# Patient Record
Sex: FEMALE | Race: WHITE | NOT HISPANIC OR LATINO | Employment: OTHER | URBAN - METROPOLITAN AREA
[De-identification: names, ages, dates, MRNs, and addresses within clinical notes are randomized per-mention and may not be internally consistent; named-entity substitution may affect disease eponyms.]

---

## 2023-03-20 DIAGNOSIS — D89.813 GRAFT VS HOST DISEASE (MULTI): ICD-10-CM

## 2023-03-20 DIAGNOSIS — N39.0 RECURRENT UTI: Primary | ICD-10-CM

## 2023-03-20 DIAGNOSIS — E11.69 TYPE 2 DIABETES MELLITUS WITH OTHER SPECIFIED COMPLICATION, WITHOUT LONG-TERM CURRENT USE OF INSULIN (MULTI): Primary | ICD-10-CM

## 2023-03-20 RX ORDER — PREDNISONE 1 MG/1
1 TABLET ORAL DAILY
COMMUNITY
Start: 2022-04-11 | End: 2023-03-20 | Stop reason: SDUPTHER

## 2023-03-20 RX ORDER — ASPIRIN 81 MG/1
81 TABLET ORAL DAILY
COMMUNITY

## 2023-03-20 RX ORDER — ROSUVASTATIN CALCIUM 10 MG/1
10 TABLET, COATED ORAL NIGHTLY
COMMUNITY

## 2023-03-20 RX ORDER — PREDNISONE 1 MG/1
1 TABLET ORAL DAILY
Qty: 90 TABLET | Refills: 3 | Status: SHIPPED | OUTPATIENT
Start: 2023-03-20 | End: 2023-07-23

## 2023-03-20 RX ORDER — ESTRADIOL 0.1 MG/G
1 CREAM VAGINAL 3 TIMES WEEKLY
COMMUNITY
End: 2023-03-20 | Stop reason: SDUPTHER

## 2023-03-20 RX ORDER — AMLODIPINE BESYLATE 10 MG/1
1 TABLET ORAL DAILY
COMMUNITY
Start: 2022-01-01 | End: 2023-07-10 | Stop reason: SDUPTHER

## 2023-03-20 RX ORDER — METFORMIN HYDROCHLORIDE 500 MG/1
1000 TABLET, EXTENDED RELEASE ORAL
COMMUNITY
Start: 2021-02-07 | End: 2023-03-20 | Stop reason: SDUPTHER

## 2023-03-20 RX ORDER — POTASSIUM CHLORIDE 750 MG/1
10 TABLET, FILM COATED, EXTENDED RELEASE ORAL 3 TIMES WEEKLY
COMMUNITY
Start: 2022-04-08 | End: 2023-04-03 | Stop reason: SDUPTHER

## 2023-03-20 RX ORDER — METFORMIN HYDROCHLORIDE 500 MG/1
1000 TABLET, EXTENDED RELEASE ORAL
Qty: 180 TABLET | Refills: 3 | Status: SHIPPED | OUTPATIENT
Start: 2023-03-20 | End: 2024-03-19

## 2023-03-20 RX ORDER — ONDANSETRON 4 MG/1
4 TABLET, ORALLY DISINTEGRATING ORAL EVERY 8 HOURS PRN
COMMUNITY
Start: 2021-01-28 | End: 2023-05-10 | Stop reason: SDUPTHER

## 2023-03-20 RX ORDER — CHOLECALCIFEROL (VITAMIN D3) 125 MCG
125 CAPSULE ORAL DAILY
COMMUNITY

## 2023-03-20 RX ORDER — ESTRADIOL 0.1 MG/G
1 CREAM VAGINAL 3 TIMES WEEKLY
Qty: 42.5 G | Refills: 3 | Status: SHIPPED | OUTPATIENT
Start: 2023-03-20 | End: 2023-10-16 | Stop reason: SDUPTHER

## 2023-03-24 DIAGNOSIS — Z94.81 BONE MARROW TRANSPLANT STATUS (MULTI): ICD-10-CM

## 2023-03-24 DIAGNOSIS — D89.813 GRAFT VS HOST DISEASE (MULTI): ICD-10-CM

## 2023-03-24 RX ORDER — PREDNISONE 5 MG/1
TABLET ORAL
Qty: 90 TABLET | OUTPATIENT
Start: 2023-03-24

## 2023-03-24 RX ORDER — PREDNISONE 1 MG/1
TABLET ORAL
Qty: 270 TABLET | OUTPATIENT
Start: 2023-03-24

## 2023-04-03 DIAGNOSIS — Z94.81 BONE MARROW TRANSPLANT STATUS (MULTI): ICD-10-CM

## 2023-04-03 DIAGNOSIS — D89.813 GRAFT VS HOST DISEASE (MULTI): ICD-10-CM

## 2023-04-03 RX ORDER — PREDNISONE 5 MG/1
5 TABLET ORAL DAILY
Qty: 30 TABLET | Refills: 3 | Status: SHIPPED | OUTPATIENT
Start: 2023-04-03 | End: 2023-04-03 | Stop reason: SDUPTHER

## 2023-04-03 RX ORDER — PREDNISONE 5 MG/1
5 TABLET ORAL DAILY
Qty: 30 TABLET | Refills: 3 | Status: SHIPPED | OUTPATIENT
Start: 2023-04-03 | End: 2023-06-25 | Stop reason: SDUPTHER

## 2023-04-03 RX ORDER — PREDNISONE 5 MG/1
5 TABLET ORAL DAILY
COMMUNITY
End: 2023-04-03 | Stop reason: SDUPTHER

## 2023-04-03 RX ORDER — POTASSIUM CHLORIDE 750 MG/1
10 TABLET, FILM COATED, EXTENDED RELEASE ORAL 3 TIMES WEEKLY
Qty: 30 TABLET | Refills: 3 | Status: SHIPPED | OUTPATIENT
Start: 2023-04-03 | End: 2023-04-03 | Stop reason: SDUPTHER

## 2023-04-03 RX ORDER — POTASSIUM CHLORIDE 750 MG/1
10 TABLET, FILM COATED, EXTENDED RELEASE ORAL DAILY
Qty: 90 TABLET | Refills: 3 | Status: SHIPPED | OUTPATIENT
Start: 2023-04-03 | End: 2024-04-02

## 2023-04-25 ENCOUNTER — DOCUMENTATION (OUTPATIENT)
Dept: PRIMARY CARE | Facility: CLINIC | Age: 68
End: 2023-04-25
Payer: MEDICARE

## 2023-04-25 NOTE — PROGRESS NOTES
Called this morning with severe pain in right upper quadrant wrapping around to her back.  Associated with nausea an vomiting .  Took tylenol and zofran without relief.  Advised her to go to ER. She is in [place].  Her  Dr. Zamorano called a few hours later with an update. 6.8 cm x 5 cm mass in area of tail of pancreas ( which has previously been resected).  Is present.  Dr. Zamorano planning on flying down to [place] tomorrow.  She is being admitted for pain control.  She has follow up appointment in [place] on Wednesday with Dr. Trevor Nguyen in surgery at [facility].

## 2023-04-26 DIAGNOSIS — D89.813 GRAFT VS HOST DISEASE (MULTI): ICD-10-CM

## 2023-04-26 DIAGNOSIS — Z94.81 BONE MARROW TRANSPLANT STATUS (MULTI): ICD-10-CM

## 2023-04-26 RX ORDER — RUXOLITINIB 5 MG/1
TABLET ORAL
Qty: 30 TABLET | Refills: 0 | Status: SHIPPED | OUTPATIENT
Start: 2023-04-26 | End: 2023-05-30 | Stop reason: SDUPTHER

## 2023-05-10 ENCOUNTER — TELEPHONE (OUTPATIENT)
Dept: PRIMARY CARE | Facility: CLINIC | Age: 68
End: 2023-05-10
Payer: MEDICARE

## 2023-05-10 DIAGNOSIS — R11.0 NAUSEA: Primary | ICD-10-CM

## 2023-05-10 RX ORDER — ONDANSETRON 4 MG/1
4 TABLET, ORALLY DISINTEGRATING ORAL EVERY 8 HOURS PRN
Qty: 30 TABLET | Refills: 1 | Status: SHIPPED | OUTPATIENT
Start: 2023-05-10 | End: 2023-05-23 | Stop reason: SDUPTHER

## 2023-05-10 NOTE — TELEPHONE ENCOUNTER
Update-  They are back in [place] until May 17.  She is doing well, pain is being controlled only with Tylenol. She is  having some nausea and would like a Rx for Zofran sent to [facility] in [place]      I can call her when Rx sent.

## 2023-05-17 PROBLEM — K86.2 PANCREATIC CYST (HHS-HCC): Status: RESOLVED | Noted: 2023-05-17 | Resolved: 2023-05-17

## 2023-05-17 PROBLEM — M43.10 ACQUIRED SPONDYLOLISTHESIS: Status: RESOLVED | Noted: 2023-05-17 | Resolved: 2023-05-17

## 2023-05-17 PROBLEM — K21.9 GERD (GASTROESOPHAGEAL REFLUX DISEASE): Status: ACTIVE | Noted: 2023-05-17

## 2023-05-17 PROBLEM — G47.33 OBSTRUCTIVE SLEEP APNEA OF ADULT: Status: ACTIVE | Noted: 2023-05-17

## 2023-05-17 PROBLEM — M48.061 LUMBAR STENOSIS: Status: ACTIVE | Noted: 2023-05-17

## 2023-05-17 PROBLEM — R31.21 ASYMPTOMATIC MICROSCOPIC HEMATURIA: Status: RESOLVED | Noted: 2023-05-17 | Resolved: 2023-05-17

## 2023-05-17 PROBLEM — E27.49 SECONDARY ADRENAL INSUFFICIENCY (MULTI): Status: ACTIVE | Noted: 2023-05-17

## 2023-05-17 PROBLEM — M71.38 SYNOVIAL CYST OF LUMBAR SPINE: Status: RESOLVED | Noted: 2023-05-17 | Resolved: 2023-05-17

## 2023-05-17 PROBLEM — D84.9 IMMUNODEFICIENCY (MULTI): Status: ACTIVE | Noted: 2023-05-17

## 2023-05-17 PROBLEM — D89.813 GRAFT VS HOST DISEASE (MULTI): Status: ACTIVE | Noted: 2023-05-17

## 2023-05-17 PROBLEM — F41.9 ANXIETY DISORDER: Status: RESOLVED | Noted: 2023-05-17 | Resolved: 2023-05-17

## 2023-05-17 PROBLEM — E87.6 HYPOKALEMIA: Status: RESOLVED | Noted: 2023-05-17 | Resolved: 2023-05-17

## 2023-05-17 PROBLEM — E27.40 ADRENAL INSUFFICIENCY (MULTI): Status: ACTIVE | Noted: 2023-05-17

## 2023-05-17 PROBLEM — H04.123 BILATERAL DRY EYES: Status: RESOLVED | Noted: 2023-05-17 | Resolved: 2023-05-17

## 2023-05-17 PROBLEM — E78.5 HYPERLIPIDEMIA: Status: ACTIVE | Noted: 2023-05-17

## 2023-05-17 PROBLEM — M81.0 OSTEOPOROSIS: Status: ACTIVE | Noted: 2023-05-17

## 2023-05-17 PROBLEM — D17.71 ANGIOLIPOMA OF KIDNEY: Status: ACTIVE | Noted: 2023-05-17

## 2023-05-17 PROBLEM — H90.3 SENSORINEURAL HEARING LOSS (SNHL), BILATERAL: Status: RESOLVED | Noted: 2023-05-17 | Resolved: 2023-05-17

## 2023-05-17 PROBLEM — I44.7 LBBB (LEFT BUNDLE BRANCH BLOCK): Status: RESOLVED | Noted: 2023-05-17 | Resolved: 2023-05-17

## 2023-05-17 PROBLEM — G47.30 APNEA, SLEEP: Status: ACTIVE | Noted: 2023-05-17

## 2023-05-17 PROBLEM — D80.1 HYPOGAMMAGLOBULINEMIA (MULTI): Status: ACTIVE | Noted: 2023-05-17

## 2023-05-17 PROBLEM — E04.2 MULTINODULAR GOITER: Status: ACTIVE | Noted: 2023-05-17

## 2023-05-17 PROBLEM — I10 HYPERTENSION: Status: ACTIVE | Noted: 2023-05-17

## 2023-05-17 PROBLEM — F51.02 INSOMNIA, TRANSIENT: Status: RESOLVED | Noted: 2023-05-17 | Resolved: 2023-05-17

## 2023-05-17 PROBLEM — D84.9 IMMUNODEFICIENCY (MULTI): Status: RESOLVED | Noted: 2023-05-17 | Resolved: 2023-05-17

## 2023-05-17 PROBLEM — Z94.81 BONE MARROW TRANSPLANT STATUS (MULTI): Status: RESOLVED | Noted: 2023-05-17 | Resolved: 2023-05-17

## 2023-05-17 PROBLEM — E11.9 TYPE 2 DIABETES MELLITUS WITHOUT COMPLICATION, WITHOUT LONG-TERM CURRENT USE OF INSULIN (MULTI): Status: ACTIVE | Noted: 2023-05-17

## 2023-05-17 PROBLEM — G43.909 MIGRAINE: Status: ACTIVE | Noted: 2023-05-17

## 2023-05-17 RX ORDER — LISINOPRIL 5 MG/1
5 TABLET ORAL DAILY
COMMUNITY
End: 2023-10-04 | Stop reason: SDUPTHER

## 2023-05-17 RX ORDER — PROPRANOLOL HYDROCHLORIDE 20 MG/1
20 TABLET ORAL NIGHTLY
COMMUNITY
End: 2023-11-15

## 2023-05-17 RX ORDER — PANCRELIPASE 36000; 180000; 114000 [USP'U]/1; [USP'U]/1; [USP'U]/1
CAPSULE, DELAYED RELEASE PELLETS ORAL
COMMUNITY
Start: 2022-12-27 | End: 2023-09-14 | Stop reason: ALTCHOICE

## 2023-05-17 RX ORDER — PANTOPRAZOLE SODIUM 40 MG/1
40 TABLET, DELAYED RELEASE ORAL DAILY
COMMUNITY
Start: 2023-04-26 | End: 2023-06-07 | Stop reason: SDUPTHER

## 2023-05-17 RX ORDER — DENOSUMAB 60 MG/ML
INJECTION SUBCUTANEOUS
COMMUNITY
Start: 2022-06-30 | End: 2023-05-31 | Stop reason: SDUPTHER

## 2023-05-17 RX ORDER — BUSPIRONE HYDROCHLORIDE 10 MG/1
10 TABLET ORAL 3 TIMES DAILY
COMMUNITY
End: 2023-06-25 | Stop reason: SDUPTHER

## 2023-05-17 RX ORDER — ESCITALOPRAM OXALATE 10 MG/1
10 TABLET ORAL DAILY
COMMUNITY
End: 2023-05-18 | Stop reason: SDUPTHER

## 2023-05-17 RX ORDER — OXYCODONE AND ACETAMINOPHEN 5; 325 MG/1; MG/1
TABLET ORAL
COMMUNITY
Start: 2023-04-26

## 2023-05-17 RX ORDER — BLOOD-GLUCOSE METER
EACH MISCELLANEOUS
COMMUNITY
Start: 2020-07-14 | End: 2023-05-18 | Stop reason: ALTCHOICE

## 2023-05-17 NOTE — PROGRESS NOTES
Hortencia Zamorano is a 67 y.o. female who presents Follow-up  Hortencia has  a complicated medical history :   Primary Myelofibrosis With Myeloid Metaplasia dx'd may 2013 S/P Bone Marrow Transplant 2013 (Dr. 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 Hutchison in [place] to follow her for her primary myelofibrosis status post bone marrow transplant in 2013 and subsequent [id number] disease. He is managing her prednisone. She is now on  6 mg daily. She has subsequent diabetes and osteoporosis     She is also following with Dr. CONDE for immunodeficiency and IVIG infusions. Her last infusion was supposed to be in [place] but she missed it due to her recent hospitalization for her pancreatitis like abdominal pain.  She receives them once monthly.      She was being followed for a Pancreatic cystic lesion that had enlarged and subsequently had a distal pancreatectomy in Dec. 2022 for IPMN with LGD.  At the end of April she had severe pain and went to ER in [place]. CT abdomen identified fluid collection in distal pancreatic bed of unknown source.  Query hemorraghic.  She has been having ongoing daily lower abdominal pain and nausea. She has been taking tylenol and zofran.  In addition she has been having loose stool this past week.  Her , Dr. Aleksey Zamorano had suggested she re start her Creon in case she had been having any pancreatic insuffiencey.  She is scheduled for a repeat CT abdomen and pelvis with contrast tomorrow.  She had lab work done today.    Her pain is with movement in the am.  Subsides if lying down.    She can have night time pain and nausea though as well.        Was supposed to get prolia shot - in [place] but misssed it.  Will need to get it her in [place].   She is now on daily amoxicillin for her immunodeficiency.        Patient Active Problem List   Diagnosis    Type 2 diabetes mellitus without complication, without long-term current use of  insulin (CMS/HCC)    Secondary adrenal insufficiency (CMS/HCC)    Osteoporosis    Obstructive sleep apnea of adult    Essential (primary) hypertension    Hyperlipidemia    GERD (gastroesophageal reflux disease)    Apnea, sleep    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)    Hypogammaglobulinemia (CMS/HCC)    Adrenal insufficiency (CMS/HCC)    Migraine    Multinodular goiter    Lumbar stenosis    Angiolipoma of kidney        Past Medical History:   Diagnosis Date    Acquired spondylolisthesis     Age-related nuclear cataract, bilateral     Anemia     Anxiety disorder     Bone marrow transplant status (CMS/HCC)     Dry eye syndrome of right lacrimal gland     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)     Hypomagnesemia     LBBB (left bundle branch block)     Migraine, unspecified, not intractable, without status migrainosus     Migraines     Myelofibrosis (CMS/HCC)     Pancreatic cyst     Punctate keratitis, left eye     Radiculopathy, lumbar region     Sensorineural hearing loss (SNHL), bilateral     Spinal stenosis, lumbar region with neurogenic claudication     Syncope     Synovial cyst of lumbar spine         Past Surgical History:   Procedure Laterality Date    BLEPHAROPTOSIS REPAIR      BONE MARROW TRANSPLANT      CHOLECYSTECTOMY      DISTAL PANCREATECTOMY  12/12/2022    HYSTERECTOMY      TONSILLECTOMY          Social History     Tobacco Use    Smoking status: Never   Vaping Use    Vaping status: Never Used         Current Outpatient Medications:     amLODIPine (Norvasc) 10 mg tablet, Take 1 tablet (10 mg) by mouth once daily., Disp: , Rfl:     aspirin 81 mg EC tablet, Take 1 tablet (81 mg) by mouth once daily., Disp: , Rfl:     busPIRone (Buspar) 10 mg tablet, Take 1 tablet (10 mg) by mouth 3 times a day., Disp: , Rfl:     cholecalciferol (Vitamin D-3) 125 MCG (5000 UT) capsule, Take 1 capsule (125 mcg) by mouth once daily., Disp: , Rfl:     Creon 36,000-114,000- 180,000 unit capsule,delayed release(DR/EC) capsule, TAKE 2 CAPSULES  BY MOUTH THREE TIMES DAILY WITH MEALS, Disp: , Rfl:     denosumab (Prolia) 60 mg/mL syringe, Inject under the skin., Disp: , Rfl:     estradiol (Estrace) 0.01 % (0.1 mg/gram) vaginal cream, Insert 10 Applications into the vagina 3 times a week., Disp: 42.5 g, Rfl: 3    Jakafi 5 mg tablet, Take one (1) tablet by mouth once daily, Disp: 30 tablet, Rfl: 0    lisinopril 5 mg tablet, Take 1 tablet (5 mg) by mouth once daily., Disp: , Rfl:     metFORMIN XR (Glucophage-XR) 500 mg 24 hr tablet, Take 2 tablets (1,000 mg) by mouth once daily in the evening. Take with meals., Disp: 180 tablet, Rfl: 3    ondansetron ODT (Zofran-ODT) 4 mg disintegrating tablet, Take 1 tablet (4 mg) by mouth every 8 hours if needed for nausea., Disp: 30 tablet, Rfl: 1    oxyCODONE-acetaminophen (Percocet) 5-325 mg tablet, TAKE 1 TABLET BY MOUTH EVERY 6 HOURS AS NEEDED FOR NON ACUTE PAIN, Disp: , Rfl:     pantoprazole (ProtoNix) 40 mg EC tablet, Take 1 tablet (40 mg) by mouth once daily., Disp: , Rfl:     potassium chloride CR 10 mEq ER tablet, Take 1 tablet (10 mEq) by mouth once daily., Disp: 90 tablet, Rfl: 3    predniSONE (Deltasone) 1 mg tablet, Take 1 tablet (1 mg) by mouth once daily., Disp: 90 tablet, Rfl: 3    predniSONE (Deltasone) 5 mg tablet, Take 1 tablet (5 mg) by mouth once daily. as directed, Disp: 30 tablet, Rfl: 3    propranolol (Inderal) 20 mg tablet, Take 1 tablet (20 mg) by mouth once daily at bedtime., Disp: , Rfl:     rosuvastatin (Crestor) 10 mg tablet, Take 1 tablet (10 mg) by mouth once daily at bedtime., Disp: , Rfl:     amoxicillin (Amoxil) 500 mg tablet, Take 1 tablet (500 mg) by mouth once daily., Disp: 90 tablet, Rfl: 3    escitalopram (Lexapro) 10 mg tablet, Take 1 tablet (10 mg) by mouth once daily., Disp: 90 tablet, Rfl: 3     Allergies   Allergen Reactions    Ciprofloxacin Unknown     flu like sx    Nitrofurantoin Monohyd/M-Cryst Unknown    Sulfamethoxazole-Trimethoprim Unknown    Cephalosporins Rash           BP  "120/72 (BP Location: Right arm, Patient Position: Sitting, BP Cuff Size: Adult)   Pulse 71   Temp 36 °C (96.8 °F)   Ht 1.549 m (5' 1\")   Wt 50.3 kg (111 lb)   SpO2 98%   BMI 20.97 kg/m²  Body mass index is 20.97 kg/m².   Physical Exam  Constitutional:       Appearance: Normal appearance.   Cardiovascular:      Rate and Rhythm: Normal rate and regular rhythm.   Pulmonary:      Effort: Pulmonary effort is normal.      Breath sounds: Normal breath sounds.   Abdominal:      General: There is no distension.      Palpations: Abdomen is soft.      Tenderness: There is abdominal tenderness (diffusely.  no guarding). There is no rebound.   Neurological:      Mental Status: She is alert.           Problem List Items Addressed This Visit    None  Visit Diagnoses       Immunoglobulin deficiency (CMS/HCC)    -  Primary    Relevant Medications    amoxicillin (Amoxil) 500 mg tablet    Depression, unspecified depression type        Relevant Medications    escitalopram (Lexapro) 10 mg tablet             Assessment/Plan   Ongoing abdominal discomfort and nausea which presented abruptly at the end of April in [place] and she was found to have a fluid collection in the distal part of the surgical bed of her pancreatectomy.  She was managed in [place] in the acute stage with fluids and pain control and then further evaluated in [place] by her surgeon, Dr. Nguyen.  No intervention was done and conservative treatment was deemed most appropriate.  However she has required round-the-clock Tylenol and Zofran and the pain has not subsided.  Repeat lab work was done today and she is scheduled to repeat her abdominal CT with contrast tomorrow.  She does have a follow-up with Dr. Ngueyn afterwards.  Her vital signs are stable and the CBC which was the only lab back appears stable. There does not seem to be any evidence of ongoing bleeding or infection.  She has had loose stools for the past week.  Given her mildly elevated lipase and what " appeared to be early pancreatitis and her loose stools her  Dr. Zamorano had suggested that she restart her Creon in case she is having any pancreatic insufficiency.  I would also like her to increase her Protonix to 40 mg twice daily for the next week or 2 to see if this helps with her nausea.  We will await the lab work and abdominal CT and continue to manage her symptoms expectantly at this point.    Annual imaging for angiolipomas of kidneys due in June 2023 will be evaluated with the scan tomorrow.   She will also need to reschedule her Prolia    Belkis Schmitt MD        TVT of 40 minutes with greater than 50% spent FTF in assessment/discussion and care coordination

## 2023-05-18 ENCOUNTER — OFFICE VISIT (OUTPATIENT)
Dept: PRIMARY CARE | Facility: CLINIC | Age: 68
End: 2023-05-18
Payer: MEDICARE

## 2023-05-18 VITALS
WEIGHT: 111 LBS | SYSTOLIC BLOOD PRESSURE: 120 MMHG | HEART RATE: 71 BPM | HEIGHT: 61 IN | OXYGEN SATURATION: 98 % | DIASTOLIC BLOOD PRESSURE: 72 MMHG | BODY MASS INDEX: 20.96 KG/M2 | TEMPERATURE: 96.8 F

## 2023-05-18 DIAGNOSIS — F32.A DEPRESSION, UNSPECIFIED DEPRESSION TYPE: ICD-10-CM

## 2023-05-18 DIAGNOSIS — D80.9 IMMUNOGLOBULIN DEFICIENCY (MULTI): Primary | ICD-10-CM

## 2023-05-18 PROCEDURE — 3074F SYST BP LT 130 MM HG: CPT | Performed by: INTERNAL MEDICINE

## 2023-05-18 PROCEDURE — 99214 OFFICE O/P EST MOD 30 MIN: CPT | Performed by: INTERNAL MEDICINE

## 2023-05-18 PROCEDURE — 4010F ACE/ARB THERAPY RXD/TAKEN: CPT | Performed by: INTERNAL MEDICINE

## 2023-05-18 PROCEDURE — 1159F MED LIST DOCD IN RCRD: CPT | Performed by: INTERNAL MEDICINE

## 2023-05-18 PROCEDURE — 1160F RVW MEDS BY RX/DR IN RCRD: CPT | Performed by: INTERNAL MEDICINE

## 2023-05-18 PROCEDURE — 3078F DIAST BP <80 MM HG: CPT | Performed by: INTERNAL MEDICINE

## 2023-05-18 RX ORDER — AMOXICILLIN 500 MG/1
500 TABLET, FILM COATED ORAL DAILY
Qty: 90 TABLET | Refills: 3 | Status: SHIPPED | OUTPATIENT
Start: 2023-05-18 | End: 2024-05-17

## 2023-05-18 RX ORDER — AMOXICILLIN 500 MG/1
500 TABLET, FILM COATED ORAL DAILY
COMMUNITY
Start: 2023-04-17 | End: 2023-05-18 | Stop reason: SDUPTHER

## 2023-05-18 RX ORDER — ESCITALOPRAM OXALATE 10 MG/1
10 TABLET ORAL DAILY
Qty: 90 TABLET | Refills: 3 | Status: SHIPPED | OUTPATIENT
Start: 2023-05-18 | End: 2024-05-17

## 2023-05-18 ASSESSMENT — PAIN SCALES - GENERAL: PAINLEVEL: 6

## 2023-05-23 DIAGNOSIS — R11.0 NAUSEA: ICD-10-CM

## 2023-05-23 RX ORDER — ONDANSETRON 4 MG/1
4 TABLET, ORALLY DISINTEGRATING ORAL EVERY 8 HOURS PRN
Qty: 30 TABLET | Refills: 1 | Status: SHIPPED | OUTPATIENT
Start: 2023-05-23 | End: 2023-06-22

## 2023-05-23 NOTE — TELEPHONE ENCOUNTER
Update-Feeling a lot better except the nausea.  Pain level is about a 3 tolerable.    Patient requesting refill on Zofran

## 2023-05-30 DIAGNOSIS — D89.813 GRAFT VS HOST DISEASE (MULTI): ICD-10-CM

## 2023-05-30 DIAGNOSIS — Z94.81 BONE MARROW TRANSPLANT STATUS (MULTI): ICD-10-CM

## 2023-05-31 DIAGNOSIS — M81.0 OSTEOPOROSIS, UNSPECIFIED OSTEOPOROSIS TYPE, UNSPECIFIED PATHOLOGICAL FRACTURE PRESENCE: Primary | ICD-10-CM

## 2023-05-31 RX ORDER — DENOSUMAB 60 MG/ML
60 INJECTION SUBCUTANEOUS ONCE
Qty: 1 ML | Refills: 3 | Status: SHIPPED | OUTPATIENT
Start: 2023-05-31 | End: 2023-05-31

## 2023-06-07 DIAGNOSIS — K21.9 GASTROESOPHAGEAL REFLUX DISEASE, UNSPECIFIED WHETHER ESOPHAGITIS PRESENT: Primary | ICD-10-CM

## 2023-06-07 RX ORDER — PANTOPRAZOLE SODIUM 40 MG/1
40 TABLET, DELAYED RELEASE ORAL
Qty: 90 TABLET | Refills: 3 | Status: SHIPPED | OUTPATIENT
Start: 2023-06-07 | End: 2023-10-04 | Stop reason: ALTCHOICE

## 2023-06-25 DIAGNOSIS — D89.813 GRAFT VS HOST DISEASE (MULTI): ICD-10-CM

## 2023-06-25 DIAGNOSIS — Z94.81 BONE MARROW TRANSPLANT STATUS (MULTI): ICD-10-CM

## 2023-06-25 DIAGNOSIS — F41.9 ANXIETY: Primary | ICD-10-CM

## 2023-06-25 RX ORDER — PREDNISONE 5 MG/1
5 TABLET ORAL DAILY
Qty: 30 TABLET | Refills: 3 | Status: SHIPPED | OUTPATIENT
Start: 2023-06-25 | End: 2023-09-14 | Stop reason: ALTCHOICE

## 2023-06-25 RX ORDER — BUSPIRONE HYDROCHLORIDE 10 MG/1
10 TABLET ORAL 3 TIMES DAILY
Qty: 270 TABLET | Refills: 3 | Status: SHIPPED | OUTPATIENT
Start: 2023-06-25 | End: 2024-06-24

## 2023-07-10 ENCOUNTER — OFFICE VISIT (OUTPATIENT)
Dept: PRIMARY CARE | Facility: CLINIC | Age: 68
End: 2023-07-10
Payer: MEDICARE

## 2023-07-10 VITALS
OXYGEN SATURATION: 98 % | DIASTOLIC BLOOD PRESSURE: 68 MMHG | TEMPERATURE: 98 F | HEART RATE: 65 BPM | SYSTOLIC BLOOD PRESSURE: 111 MMHG

## 2023-07-10 DIAGNOSIS — I10 ESSENTIAL (PRIMARY) HYPERTENSION: Primary | ICD-10-CM

## 2023-07-10 DIAGNOSIS — J02.9 ACUTE PHARYNGITIS, UNSPECIFIED ETIOLOGY: Primary | ICD-10-CM

## 2023-07-10 PROBLEM — F41.9 ANXIETY AND DEPRESSION: Status: ACTIVE | Noted: 2018-02-02

## 2023-07-10 PROBLEM — F32.A ANXIETY AND DEPRESSION: Status: ACTIVE | Noted: 2018-02-02

## 2023-07-10 PROCEDURE — 1160F RVW MEDS BY RX/DR IN RCRD: CPT | Performed by: INTERNAL MEDICINE

## 2023-07-10 PROCEDURE — 1125F AMNT PAIN NOTED PAIN PRSNT: CPT | Performed by: INTERNAL MEDICINE

## 2023-07-10 PROCEDURE — 3074F SYST BP LT 130 MM HG: CPT | Performed by: INTERNAL MEDICINE

## 2023-07-10 PROCEDURE — 99213 OFFICE O/P EST LOW 20 MIN: CPT | Performed by: INTERNAL MEDICINE

## 2023-07-10 PROCEDURE — 1159F MED LIST DOCD IN RCRD: CPT | Performed by: INTERNAL MEDICINE

## 2023-07-10 PROCEDURE — 3078F DIAST BP <80 MM HG: CPT | Performed by: INTERNAL MEDICINE

## 2023-07-10 PROCEDURE — 4010F ACE/ARB THERAPY RXD/TAKEN: CPT | Performed by: INTERNAL MEDICINE

## 2023-07-10 PROCEDURE — 87635 SARS-COV-2 COVID-19 AMP PRB: CPT

## 2023-07-10 RX ORDER — AMLODIPINE BESYLATE 10 MG/1
10 TABLET ORAL DAILY
Qty: 90 TABLET | Refills: 1 | Status: SHIPPED | OUTPATIENT
Start: 2023-07-10

## 2023-07-10 RX ORDER — CALCIUM CARBONATE 500(1250)
1250 TABLET ORAL
COMMUNITY
Start: 2018-03-09

## 2023-07-10 RX ORDER — ACETAMINOPHEN 500 MG
500 TABLET ORAL EVERY 8 HOURS PRN
COMMUNITY

## 2023-07-10 RX ORDER — CLOTRIMAZOLE AND BETAMETHASONE DIPROPIONATE 10; .64 MG/G; MG/G
CREAM TOPICAL
COMMUNITY
Start: 2021-05-19

## 2023-07-10 RX ORDER — PREDNISONE 5 MG/1
1 TABLET ORAL DAILY
COMMUNITY
Start: 2020-03-26

## 2023-07-10 NOTE — PROGRESS NOTES
Hortencia Zamorano is a 67 y.o. female who presents URI    HPI: symptoms started yesterday with ST. Her uvula feels inflamed.   Her  was ill for the past 7 days and was just put on Zithromax.  His symptoms included Headache, fatigue, body aches, sore throat.  Dry cough.   Currently she only has a ST but her symptoms started suddenly.  She is immunocompromised      Problem List as of 7/10/2023 Reviewed: 5/18/2023 10:42 PM by Belkis Schmitt MD      Type 2 diabetes mellitus without complication, without long-term current use of insulin (CMS/HCC)    Secondary adrenal insufficiency (CMS/HCC)    Osteoporosis    Obstructive sleep apnea of adult    Essential (primary) hypertension    Hyperlipidemia    GERD (gastroesophageal reflux disease)    Apnea, sleep    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)    Hypogammaglobulinemia (CMS/HCC)    Adrenal insufficiency (CMS/HCC)    Migraine    Multinodular goiter    Lumbar stenosis    Angiolipoma of kidney        Patient Active Problem List   Diagnosis    Type 2 diabetes mellitus without complication, without long-term current use of insulin (CMS/HCC)    Secondary adrenal insufficiency (CMS/HCC)    Osteoporosis    Obstructive sleep apnea of adult    Essential (primary) hypertension    Hyperlipidemia    GERD (gastroesophageal reflux disease)    Apnea, sleep    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)    Hypogammaglobulinemia (CMS/HCC)    Adrenal insufficiency (CMS/HCC)    Migraine    Multinodular goiter    Lumbar stenosis    Angiolipoma of kidney    Anxiety and depression        Past Medical History:   Diagnosis Date    Acquired spondylolisthesis     Age-related nuclear cataract, bilateral     Anemia     Anxiety disorder     Bone marrow transplant status (CMS/HCC)     Dry eye syndrome of right lacrimal gland     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)     Hypomagnesemia     LBBB (left bundle branch block)     Migraine, unspecified, not intractable, without status migrainosus     Migraines     Myelofibrosis  (CMS/[facility])     Pancreatic cyst     Punctate keratitis, left eye     Radiculopathy, lumbar region     Sensorineural hearing loss (SNHL), bilateral     Spinal stenosis, lumbar region with neurogenic claudication     Syncope     Synovial cyst of lumbar spine         Past Surgical History:   Procedure Laterality Date    BLEPHAROPTOSIS REPAIR      BONE MARROW TRANSPLANT      CHOLECYSTECTOMY      DISTAL PANCREATECTOMY  12/12/2022    HYSTERECTOMY      TONSILLECTOMY          Social History     Tobacco Use    Smoking status: Never   Vaping Use    Vaping Use: Never used         Current Outpatient Medications:     calcium 500 mg calcium (1,250 mg) tablet, Take 1,250 mg by mouth once daily., Disp: , Rfl:     clotrimazole-betamethasone (Lotrisone) cream, Twice daily., Disp: , Rfl:     predniSONE (Deltasone) 5 mg tablet, Take 1 tablet (5 mg) by mouth once daily., Disp: , Rfl:     acetaminophen (Tylenol) 500 mg tablet, Take 1 tablet (500 mg) by mouth every 8 hours if needed., Disp: , Rfl:     amLODIPine (Norvasc) 10 mg tablet, Take 1 tablet (10 mg) by mouth once daily., Disp: 90 tablet, Rfl: 1    amoxicillin (Amoxil) 500 mg tablet, Take 1 tablet (500 mg) by mouth once daily., Disp: 90 tablet, Rfl: 3    aspirin 81 mg EC tablet, Take 1 tablet (81 mg) by mouth once daily., Disp: , Rfl:     busPIRone (Buspar) 10 mg tablet, Take 1 tablet (10 mg) by mouth 3 times a day., Disp: 270 tablet, Rfl: 3    cholecalciferol (Vitamin D-3) 125 MCG (5000 UT) capsule, Take 1 capsule (125 mcg) by mouth once daily., Disp: , Rfl:     Creon 36,000-114,000- 180,000 unit capsule,delayed release(DR/EC) capsule, TAKE 2 CAPSULES BY MOUTH THREE TIMES DAILY WITH MEALS, Disp: , Rfl:     denosumab (Prolia) 60 mg/mL syringe, Inject 1 mL (60 mg) under the skin 1 time for 1 dose., Disp: 1 mL, Rfl: 3    escitalopram (Lexapro) 10 mg tablet, Take 1 tablet (10 mg) by mouth once daily., Disp: 90 tablet, Rfl: 3    estradiol (Estrace) 0.01 % (0.1 mg/gram) vaginal cream,  Insert 10 Applications into the vagina 3 times a week., Disp: 42.5 g, Rfl: 3    lisinopril 5 mg tablet, Take 1 tablet (5 mg) by mouth once daily., Disp: , Rfl:     metFORMIN XR (Glucophage-XR) 500 mg 24 hr tablet, Take 2 tablets (1,000 mg) by mouth once daily in the evening. Take with meals., Disp: 180 tablet, Rfl: 3    oxyCODONE-acetaminophen (Percocet) 5-325 mg tablet, TAKE 1 TABLET BY MOUTH EVERY 6 HOURS AS NEEDED FOR NON ACUTE PAIN, Disp: , Rfl:     pantoprazole (ProtoNix) 40 mg EC tablet, Take 1 tablet (40 mg) by mouth once daily in the morning. Take before meals., Disp: 90 tablet, Rfl: 3    potassium chloride CR 10 mEq ER tablet, Take 1 tablet (10 mEq) by mouth once daily., Disp: 90 tablet, Rfl: 3    predniSONE (Deltasone) 1 mg tablet, Take 1 tablet (1 mg) by mouth once daily., Disp: 90 tablet, Rfl: 3    predniSONE (Deltasone) 5 mg tablet, Take 1 tablet (5 mg) by mouth once daily. as directed, Disp: 30 tablet, Rfl: 3    propranolol (Inderal) 20 mg tablet, Take 1 tablet (20 mg) by mouth once daily at bedtime., Disp: , Rfl:     rosuvastatin (Crestor) 10 mg tablet, Take 1 tablet (10 mg) by mouth once daily at bedtime., Disp: , Rfl:     ruxolitinib (Jakafi) 5 mg tablet, Take 1 tablet (5 mg total) by mouth once daily.  Take at about the same time each day.  Take with or without food., Disp: 90 tablet, Rfl: 3     Allergies   Allergen Reactions    Ciprofloxacin Unknown     flu like sx    Nitrofurantoin Monohyd/M-Cryst Unknown    Sulfamethoxazole-Trimethoprim Unknown    Cephalosporins Rash           /68 (BP Location: Left arm, Patient Position: Sitting)   Pulse 65   Temp 36.7 °C (98 °F)   SpO2 98%  There is no height or weight on file to calculate BMI.     Physical Exam  Constitutional:       Appearance: Normal appearance.   HENT:      Right Ear: There is impacted cerumen.      Left Ear: Tympanic membrane normal.      Mouth/Throat:      Pharynx: No oropharyngeal exudate or posterior oropharyngeal erythema.       Comments: Uvula with mild blood blister   Otherwise normal   Pulmonary:      Effort: Pulmonary effort is normal.      Breath sounds: Normal breath sounds.   Musculoskeletal:      Cervical back: Normal range of motion.   Lymphadenopathy:      Cervical: No cervical adenopathy.   Neurological:      Mental Status: She is alert.           Problem List Items Addressed This Visit    None  Visit Diagnoses       Acute pharyngitis, unspecified etiology    -  Primary    Relevant Orders    Sars-CoV-2 PCR, Symptomatic             Assessment/Plan  suspect viral however she is immunocompromised and will need to be watch closely.  Continue tylenol and fluids for now. Check Covid       Belkis Schmitt MD    TVT of 45 minutes with greater than 50% spent FTF in assessment/discussion and care coordination

## 2023-07-11 LAB — SARS-COV-2 RESULT: NOT DETECTED

## 2023-07-13 ENCOUNTER — TELEPHONE (OUTPATIENT)
Dept: PRIMARY CARE | Facility: CLINIC | Age: 68
End: 2023-07-13
Payer: MEDICARE

## 2023-07-13 DIAGNOSIS — J40 BRONCHITIS: Primary | ICD-10-CM

## 2023-07-13 RX ORDER — AZITHROMYCIN 250 MG/1
TABLET, FILM COATED ORAL
Qty: 6 TABLET | Refills: 0 | Status: SHIPPED | OUTPATIENT
Start: 2023-07-13 | End: 2023-07-18

## 2023-07-13 NOTE — TELEPHONE ENCOUNTER
Update- She is not feeling any better and would antibiotic sent to pharmacy.    [facility] [phone]    I can call when done

## 2023-07-23 DIAGNOSIS — D89.813 GRAFT VS HOST DISEASE (MULTI): ICD-10-CM

## 2023-07-23 RX ORDER — PREDNISONE 1 MG/1
3 TABLET ORAL DAILY
Qty: 270 TABLET | Refills: 3 | Status: SHIPPED | OUTPATIENT
Start: 2023-07-23 | End: 2023-09-14 | Stop reason: ALTCHOICE

## 2023-08-18 ENCOUNTER — TELEPHONE (OUTPATIENT)
Dept: PRIMARY CARE | Facility: CLINIC | Age: 68
End: 2023-08-18
Payer: MEDICARE

## 2023-08-18 DIAGNOSIS — J06.9 UPPER RESPIRATORY TRACT INFECTION, UNSPECIFIED TYPE: Primary | ICD-10-CM

## 2023-08-18 RX ORDER — LEVOFLOXACIN 500 MG/1
500 TABLET, FILM COATED ORAL DAILY
Qty: 10 TABLET | Refills: 0 | Status: SHIPPED | OUTPATIENT
Start: 2023-08-18 | End: 2023-08-18

## 2023-08-18 RX ORDER — DOXYCYCLINE 100 MG/1
100 CAPSULE ORAL 2 TIMES DAILY
Qty: 20 CAPSULE | Refills: 0 | Status: SHIPPED | OUTPATIENT
Start: 2023-08-18 | End: 2023-08-28

## 2023-08-18 NOTE — TELEPHONE ENCOUNTER
[facility] pharmacy wont fill levaquin even though she does not recall her cipro allergy and her   also felt it was okay to try. Therefore will call in Doxycycline bid for 10 days

## 2023-08-18 NOTE — TELEPHONE ENCOUNTER
Hortencia symptoms progressed suddenly last night with severe sore throat and productive cough of discolored phlegm and fever.  She actually never completely improved 100% from Mid July when she was treated with azithromycin after prolonged viral illness.  She is immunocompromised due to GVH disease on chronic immunosuppression.  We will put her on Levaquin 500 mg daily for 10 days.  Counseled her to follow-up if not better.

## 2023-08-18 NOTE — TELEPHONE ENCOUNTER
Update-  Patient was feeling better.  Started again with dry cough, sore throat, congestion, mucus, fatigue,nausea,  diarrhea.    She wants to know what do you recommend? Antibiotic helped.    Please advise  I can call her back  [phone]

## 2023-08-30 DIAGNOSIS — Z00.00 HEALTH MAINTENANCE EXAMINATION: ICD-10-CM

## 2023-08-31 ENCOUNTER — LAB (OUTPATIENT)
Dept: LAB | Facility: LAB | Age: 68
End: 2023-08-31
Payer: MEDICARE

## 2023-08-31 DIAGNOSIS — Z00.00 HEALTH MAINTENANCE EXAMINATION: ICD-10-CM

## 2023-08-31 LAB
ALANINE AMINOTRANSFERASE (SGPT) (U/L) IN SER/PLAS: 19 U/L (ref 7–45)
ALBUMIN (G/DL) IN SER/PLAS: 3.9 G/DL (ref 3.4–5)
ALKALINE PHOSPHATASE (U/L) IN SER/PLAS: 52 U/L (ref 33–136)
ANION GAP IN SER/PLAS: 13 MMOL/L (ref 10–20)
ASPARTATE AMINOTRANSFERASE (SGOT) (U/L) IN SER/PLAS: 23 U/L (ref 9–39)
BASOPHILS (10*3/UL) IN BLOOD BY AUTOMATED COUNT: 0.05 X10E9/L (ref 0–0.1)
BASOPHILS/100 LEUKOCYTES IN BLOOD BY AUTOMATED COUNT: 0.7 % (ref 0–2)
BILIRUBIN TOTAL (MG/DL) IN SER/PLAS: 0.6 MG/DL (ref 0–1.2)
C REACTIVE PROTEIN (MG/L) IN SER/PLAS BY HIGH SENSIT: 0.6 MG/L
CALCIDIOL (25 OH VITAMIN D3) (NG/ML) IN SER/PLAS: 96 NG/ML
CALCIUM (MG/DL) IN SER/PLAS: 9.4 MG/DL (ref 8.6–10.6)
CARBON DIOXIDE, TOTAL (MMOL/L) IN SER/PLAS: 27 MMOL/L (ref 21–32)
CHLORIDE (MMOL/L) IN SER/PLAS: 108 MMOL/L (ref 98–107)
CHOLESTEROL (MG/DL) IN SER/PLAS: 113 MG/DL (ref 0–199)
CHOLESTEROL IN HDL (MG/DL) IN SER/PLAS: 49.1 MG/DL
CHOLESTEROL/HDL RATIO: 2.3
CREATININE (MG/DL) IN SER/PLAS: 0.96 MG/DL (ref 0.5–1.05)
EOSINOPHILS (10*3/UL) IN BLOOD BY AUTOMATED COUNT: 0.26 X10E9/L (ref 0–0.7)
EOSINOPHILS/100 LEUKOCYTES IN BLOOD BY AUTOMATED COUNT: 3.6 % (ref 0–6)
ERYTHROCYTE DISTRIBUTION WIDTH (RATIO) BY AUTOMATED COUNT: 16.9 % (ref 11.5–14.5)
ERYTHROCYTE MEAN CORPUSCULAR HEMOGLOBIN CONCENTRATION (G/DL) BY AUTOMATED: 31.1 G/DL (ref 32–36)
ERYTHROCYTE MEAN CORPUSCULAR VOLUME (FL) BY AUTOMATED COUNT: 91 FL (ref 80–100)
ERYTHROCYTES (10*6/UL) IN BLOOD BY AUTOMATED COUNT: 4.12 X10E12/L (ref 4–5.2)
ESTIMATED AVERAGE GLUCOSE FOR HBA1C: 146 MG/DL
GFR FEMALE: 64 ML/MIN/1.73M2
GLUCOSE (MG/DL) IN SER/PLAS: 91 MG/DL (ref 74–99)
HEMATOCRIT (%) IN BLOOD BY AUTOMATED COUNT: 37.6 % (ref 36–46)
HEMOGLOBIN (G/DL) IN BLOOD: 11.7 G/DL (ref 12–16)
HEMOGLOBIN A1C/HEMOGLOBIN TOTAL IN BLOOD: 6.7 %
IMMATURE GRANULOCYTES/100 LEUKOCYTES IN BLOOD BY AUTOMATED COUNT: 0.3 % (ref 0–0.9)
LDL: 32 MG/DL (ref 0–99)
LEUKOCYTES (10*3/UL) IN BLOOD BY AUTOMATED COUNT: 7.3 X10E9/L (ref 4.4–11.3)
LYMPHOCYTES (10*3/UL) IN BLOOD BY AUTOMATED COUNT: 0.67 X10E9/L (ref 1.2–4.8)
LYMPHOCYTES/100 LEUKOCYTES IN BLOOD BY AUTOMATED COUNT: 9.2 % (ref 13–44)
MONOCYTES (10*3/UL) IN BLOOD BY AUTOMATED COUNT: 1.17 X10E9/L (ref 0.1–1)
MONOCYTES/100 LEUKOCYTES IN BLOOD BY AUTOMATED COUNT: 16 % (ref 2–10)
NEUTROPHILS (10*3/UL) IN BLOOD BY AUTOMATED COUNT: 5.12 X10E9/L (ref 1.2–7.7)
NEUTROPHILS/100 LEUKOCYTES IN BLOOD BY AUTOMATED COUNT: 70.2 % (ref 40–80)
NRBC (PER 100 WBCS) BY AUTOMATED COUNT: 0 /100 WBC (ref 0–0)
PLATELETS (10*3/UL) IN BLOOD AUTOMATED COUNT: 336 X10E9/L (ref 150–450)
POTASSIUM (MMOL/L) IN SER/PLAS: 4.3 MMOL/L (ref 3.5–5.3)
PROTEIN TOTAL: 6.3 G/DL (ref 6.4–8.2)
SODIUM (MMOL/L) IN SER/PLAS: 144 MMOL/L (ref 136–145)
THYROTROPIN (MIU/L) IN SER/PLAS BY DETECTION LIMIT <= 0.05 MIU/L: 0.5 MIU/L (ref 0.44–3.98)
TRIGLYCERIDE (MG/DL) IN SER/PLAS: 162 MG/DL (ref 0–149)
UREA NITROGEN (MG/DL) IN SER/PLAS: 18 MG/DL (ref 6–23)
VLDL: 32 MG/DL (ref 0–40)

## 2023-08-31 PROCEDURE — 80061 LIPID PANEL: CPT

## 2023-08-31 PROCEDURE — 85025 COMPLETE CBC W/AUTO DIFF WBC: CPT

## 2023-08-31 PROCEDURE — 82306 VITAMIN D 25 HYDROXY: CPT

## 2023-08-31 PROCEDURE — 84443 ASSAY THYROID STIM HORMONE: CPT

## 2023-08-31 PROCEDURE — 86141 C-REACTIVE PROTEIN HS: CPT

## 2023-08-31 PROCEDURE — 36415 COLL VENOUS BLD VENIPUNCTURE: CPT

## 2023-08-31 PROCEDURE — 83036 HEMOGLOBIN GLYCOSYLATED A1C: CPT

## 2023-08-31 PROCEDURE — 80053 COMPREHEN METABOLIC PANEL: CPT

## 2023-09-13 PROBLEM — Z99.89 CPAP (CONTINUOUS POSITIVE AIRWAY PRESSURE) DEPENDENCE: Status: ACTIVE | Noted: 2021-07-12

## 2023-09-13 PROBLEM — D75.81 MYELOFIBROSIS (MULTI): Status: ACTIVE | Noted: 2023-09-13

## 2023-09-13 PROBLEM — E11.9 CONTROLLED DIABETES MELLITUS (MULTI): Status: ACTIVE | Noted: 2021-07-12

## 2023-09-13 RX ORDER — OMEPRAZOLE 20 MG/1
20 CAPSULE, DELAYED RELEASE ORAL DAILY
COMMUNITY
Start: 2023-07-13

## 2023-09-13 NOTE — PROGRESS NOTES
Physical Exam    Name Hortencia Zamorano    Date of Service :9/14/2023    Hortencia Zamorano is a 68 y.o. year old female who is being seen for a comprehensive exam    She has a very complicated medical history including primary myelofibrosis status post bone marrow transplant in 2013 and subsequent [id number] disease on chronic immunosuppression with Prednisone and Jafki.    ( Dr. Hutchison in [place] and Dr. Isaacs in [place]) , Adrenal insufficiency, Diabetes and Osteoporosis    She  also follows with Dr. CONDE for IVIG infusions.    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 Had high grade dysplasia and subsequent distal pancreatectomy and splenectomy on 12/22/22.  Complicated by sudden severe pain and nausea in April of 2023  and peripancreatic fluid collection  -Last CT Abdomen and pelvis 9/12/23-  with mild increase in size of dominant collection and stable SB-IPMN changes.  Pain improved but nausea persists   Follows with Trevor Nguyen at [facility]:      Other medical issues:  HTN, HLD, Renal Insuff, Complex Migraines, EZEQUIEL on CPAP,   MNG, Angiolipoma of kidney( stable 1.5 cm left upper pole  on CT Abdomen and pelvis from yesterday 9/12/23)     Her main health concerns today are   Would like to try to decrease her prednisone. Has been on 6 mg.    She  No longer follows with [facility] in [place] but does follow regularily with Dr. Omero Keith in [place].   She receives her  Prolia every 6 months and IVIG infusions monthly with both Dr. CONDE and Dr. Keith  Following with Dr. Nguyen in [facility] for her distal pancreatic fluid collection.    Recent CT scan 9/12/23.  Told to repeat in 4 months. Was taken of the creon and told to avoid milk. She uses lactaid milk to help her with constipation. She has tended towards constipation.   She tends to be constipated unless she drinks milk.     Pain across the front of  her chest with lying down.  Tylenol helps.  Worse at night mostly right sided.  Sharp.  Worse with movement.   "Across the front.   She had a persistent cough since getting her URI for the past 6 weeks.  Not all the time. Worse at night. Can be a little productive.  She thinks it is the \"martin crud\"  she doesn't get it in [place]. 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)    Hypogammaglobulinemia (CMS/HCC)    Adrenal insufficiency (CMS/HCC)    Migraine    Multinodular goiter    Lumbar stenosis    Angiolipoma of kidney    Anxiety and depression    Controlled diabetes mellitus (CMS/HCC)    CPAP (continuous positive airway pressure) dependence    Myelofibrosis (CMS/HCC)        Past Medical History:   Diagnosis Date    Acquired spondylolisthesis     Age-related nuclear cataract, bilateral     Anemia     Anxiety disorder     Bone marrow transplant status (CMS/HCC)     Dry eye syndrome of right lacrimal gland     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)     Hypomagnesemia     LBBB (left bundle branch block)     Migraine, unspecified, not intractable, without status migrainosus     Migraines     Myelofibrosis (CMS/HCC)     Pancreatic cyst     Punctate keratitis, left eye     Radiculopathy, lumbar region     Sensorineural hearing loss (SNHL), bilateral     Spinal stenosis, lumbar region with neurogenic claudication     Syncope     Synovial cyst of lumbar spine         Past Surgical History:   Procedure Laterality Date    BLEPHAROPTOSIS REPAIR      BONE MARROW TRANSPLANT      CHOLECYSTECTOMY      DISTAL PANCREATECTOMY  12/12/2022    TONSILLECTOMY      TOTAL ABDOMINAL HYSTERECTOMY W/ " BILATERAL SALPINGOOPHORECTOMY  1997        Social History     Tobacco Use    Smoking status: Never   Vaping Use    Vaping Use: Never used      Social History     Social History Narrative     to Aleksey with 3 Adult Sons and 7 grandchildren ( 5 grandsons and 2 granddaughters)     Lifelong non smoker     Spends the flores in [place]        Family History   Problem Relation Name Age of Onset    Alzheimer's disease Mother          d.85    Diabetes Mother      Hypertension Mother      Breast cancer Mother      Kidney cancer Father          d.83    Lung cancer Father      Diabetes Father      Hypertension Brother      Klinefelter's syndrome Son EVON         youngest    Aortic dissection Son Mor         attt desiree camejo. oldest    Other (brain tumor) Son River         Middle    Aplastic anemia Maternal Grandfather          d.56            Current Outpatient Medications:     omeprazole (PriLOSEC) 20 mg DR capsule, Take 1 capsule (20 mg) by mouth once daily., Disp: , Rfl:     acetaminophen (Tylenol) 500 mg tablet, Take 1 tablet (500 mg) by mouth every 8 hours if needed., Disp: , Rfl:     amLODIPine (Norvasc) 10 mg tablet, Take 1 tablet (10 mg) by mouth once daily., Disp: 90 tablet, Rfl: 1    amoxicillin (Amoxil) 500 mg tablet, Take 1 tablet (500 mg) by mouth once daily., Disp: 90 tablet, Rfl: 3    aspirin 81 mg EC tablet, Take 1 tablet (81 mg) by mouth once daily., Disp: , Rfl:     busPIRone (Buspar) 10 mg tablet, Take 1 tablet (10 mg) by mouth 3 times a day., Disp: 270 tablet, Rfl: 3    calcium 500 mg calcium (1,250 mg) tablet, Take 1,250 mg by mouth once daily., Disp: , Rfl:     cholecalciferol (Vitamin D-3) 125 MCG (5000 UT) capsule, Take 1 capsule (125 mcg) by mouth once daily., Disp: , Rfl:     clotrimazole-betamethasone (Lotrisone) cream, Twice daily., Disp: , Rfl:     denosumab (Prolia) 60 mg/mL syringe, Inject 1 mL (60 mg) under the skin 1 time for 1 dose., Disp: 1 mL, Rfl: 3    escitalopram (Lexapro) 10 mg  "tablet, Take 1 tablet (10 mg) by mouth once daily., Disp: 90 tablet, Rfl: 3    estradiol (Estrace) 0.01 % (0.1 mg/gram) vaginal cream, Insert 10 Applications into the vagina 3 times a week., Disp: 42.5 g, Rfl: 3    lisinopril 5 mg tablet, Take 1 tablet (5 mg) by mouth once daily., Disp: , Rfl:     metFORMIN XR (Glucophage-XR) 500 mg 24 hr tablet, Take 2 tablets (1,000 mg) by mouth once daily in the evening. Take with meals., Disp: 180 tablet, Rfl: 3    oxyCODONE-acetaminophen (Percocet) 5-325 mg tablet, TAKE 1 TABLET BY MOUTH EVERY 6 HOURS AS NEEDED FOR NON ACUTE PAIN, Disp: , Rfl:     pantoprazole (ProtoNix) 40 mg EC tablet, Take 1 tablet (40 mg) by mouth once daily in the morning. Take before meals., Disp: 90 tablet, Rfl: 3    potassium chloride CR 10 mEq ER tablet, Take 1 tablet (10 mEq) by mouth once daily., Disp: 90 tablet, Rfl: 3    predniSONE (Deltasone) 5 mg tablet, Take 1 tablet (5 mg) by mouth once daily., Disp: , Rfl:     propranolol (Inderal) 20 mg tablet, Take 1 tablet (20 mg) by mouth once daily at bedtime., Disp: , Rfl:     rosuvastatin (Crestor) 10 mg tablet, Take 1 tablet (10 mg) by mouth once daily at bedtime., Disp: , Rfl:     ruxolitinib (Jakafi) 5 mg tablet, Take 1 tablet (5 mg total) by mouth once daily.  Take at about the same time each day.  Take with or without food., Disp: 90 tablet, Rfl: 3    Allergies   Allergen Reactions    Sulfamethoxazole-Trimethoprim Unknown and Nausea And Vomiting    Tobramycin-Dexamethasone Unknown    Cephalosporins Rash    Ciprofloxacin Unknown and Itching     flu like sx    Nitrofurantoin Monohyd/M-Cryst Unknown and Itching    Sulfa (Sulfonamide Antibiotics) Itching       Review of Systems     /82 (BP Location: Right arm, Patient Position: Sitting)   Pulse 68   Temp 36.4 °C (97.6 °F)   Ht 1.549 m (5' 1\")   Wt 48.6 kg (107 lb 2 oz)   SpO2 98%   BMI 20.24 kg/m²  Body mass index is 20.24 kg/m².    Physical Exam  Constitutional:       General: She is not " in acute distress.     Appearance: Normal appearance. She is not ill-appearing.   HENT:      Right Ear: Tympanic membrane and ear canal normal.      Left Ear: Tympanic membrane and ear canal normal.      Mouth/Throat:      Mouth: Mucous membranes are moist.      Pharynx: No oropharyngeal exudate or posterior oropharyngeal erythema.   Eyes:      Extraocular Movements: Extraocular movements intact.      Conjunctiva/sclera: Conjunctivae normal.      Pupils: Pupils are equal, round, and reactive to light.   Cardiovascular:      Rate and Rhythm: Normal rate.      Pulses: Normal pulses.      Heart sounds: Normal heart sounds. No murmur heard.  Pulmonary:      Effort: Pulmonary effort is normal. No respiratory distress.      Breath sounds: Normal breath sounds. No wheezing, rhonchi or rales.   Chest:   Breasts:     Right: Normal. No mass, nipple discharge or skin change.      Left: No mass, nipple discharge or skin change.   Abdominal:      General: Bowel sounds are normal. There is no distension.      Palpations: There is no mass.      Tenderness: There is no abdominal tenderness. There is no guarding.   Musculoskeletal:         General: Normal range of motion.   Lymphadenopathy:      Cervical: No cervical adenopathy.      Upper Body:      Right upper body: No supraclavicular or axillary adenopathy.      Left upper body: No supraclavicular or axillary adenopathy.      Lower Body: No right inguinal adenopathy. No left inguinal adenopathy.   Skin:     General: Skin is warm and dry.      Findings: No rash.      Comments: No suspicious rashes or lesions   Neurological:      General: No focal deficit present.      Mental Status: She is alert.   Psychiatric:         Mood and Affect: Mood normal.         RESULTS/DATA:  Reviewed Standard Labs for this physical with patient ( any significant issues addressed in A/P )     ECG: LBBB. ( Not new)       Assessment/Plan   Your blood pressure was elevated today. Please check at home for  a goal of less than 130/80  Please re start both the Miralax and Metamucil to help with your constipation   Decrease your Prednisone to 5 mg daily  Be sure to follow up with Dr. Isaacs in [place].     Chest and rib xrays ordered today.     Update your skin exam with Dr. Guerrero.    You had your flu vaccine today.  I would recommend you get the updated Covid booster and RSV     Problem List Items Addressed This Visit    None  Visit Diagnoses       Encounter for screening mammogram for malignant neoplasm of breast    -  Primary    Relevant Orders    BI mammo bilateral screening tomosynthesis    Subacute cough        Relevant Orders    XR chest 2 views    Annual physical exam        Relevant Orders    ECG 12 lead (Clinic Performed)            ROUTINE:     Immunizations advised covid. RSV and flu this fall   Mammogram: last completed : 9/29/22  neg.   PAP/GYN EXAM: hysterectomy with oophorectomy.   COLONOSCOPY: 12/14/22 Dr. Rodriguez:  one  5 mm polyp in descending colon pyogenic granuloma.    DEXA:  8/10/2022 lowest T score -1.9 in the left hip now in the osteopenia range  Last Prolia injection June   OPHTHALMOLOGY:  Dr. Herbert,   current     DERMATOLOGY:   Dr. Rosa Guerrero.   DENTISTRY: In [place]       Belkis Schmitt MD

## 2023-09-14 ENCOUNTER — OFFICE VISIT (OUTPATIENT)
Dept: PRIMARY CARE | Facility: CLINIC | Age: 68
End: 2023-09-14
Payer: MEDICARE

## 2023-09-14 VITALS
WEIGHT: 107.13 LBS | DIASTOLIC BLOOD PRESSURE: 78 MMHG | OXYGEN SATURATION: 98 % | SYSTOLIC BLOOD PRESSURE: 140 MMHG | HEIGHT: 61 IN | HEART RATE: 68 BPM | BODY MASS INDEX: 20.22 KG/M2 | TEMPERATURE: 97.6 F

## 2023-09-14 DIAGNOSIS — R07.89 CHEST WALL PAIN: ICD-10-CM

## 2023-09-14 DIAGNOSIS — R05.2 SUBACUTE COUGH: ICD-10-CM

## 2023-09-14 DIAGNOSIS — E46 PROTEIN-CALORIE MALNUTRITION, UNSPECIFIED SEVERITY (MULTI): ICD-10-CM

## 2023-09-14 DIAGNOSIS — Z00.00 ANNUAL PHYSICAL EXAM: ICD-10-CM

## 2023-09-14 DIAGNOSIS — Z23 FLU VACCINE NEED: ICD-10-CM

## 2023-09-14 DIAGNOSIS — Z12.31 ENCOUNTER FOR SCREENING MAMMOGRAM FOR MALIGNANT NEOPLASM OF BREAST: Primary | ICD-10-CM

## 2023-09-14 DIAGNOSIS — E11.8 TYPE 2 DIABETES MELLITUS WITH UNSPECIFIED COMPLICATIONS (MULTI): ICD-10-CM

## 2023-09-14 PROCEDURE — 90662 IIV NO PRSV INCREASED AG IM: CPT | Performed by: INTERNAL MEDICINE

## 2023-09-14 PROCEDURE — 4010F ACE/ARB THERAPY RXD/TAKEN: CPT | Performed by: INTERNAL MEDICINE

## 2023-09-14 PROCEDURE — 3044F HG A1C LEVEL LT 7.0%: CPT | Performed by: INTERNAL MEDICINE

## 2023-09-14 PROCEDURE — 1159F MED LIST DOCD IN RCRD: CPT | Performed by: INTERNAL MEDICINE

## 2023-09-14 PROCEDURE — 93000 ELECTROCARDIOGRAM COMPLETE: CPT | Performed by: INTERNAL MEDICINE

## 2023-09-14 PROCEDURE — G0008 ADMIN INFLUENZA VIRUS VAC: HCPCS | Performed by: INTERNAL MEDICINE

## 2023-09-14 PROCEDURE — UHSPHYS PR UH SELECT PHYSICAL: Performed by: INTERNAL MEDICINE

## 2023-09-14 PROCEDURE — 1160F RVW MEDS BY RX/DR IN RCRD: CPT | Performed by: INTERNAL MEDICINE

## 2023-09-14 PROCEDURE — 3077F SYST BP >= 140 MM HG: CPT | Performed by: INTERNAL MEDICINE

## 2023-09-14 PROCEDURE — 3078F DIAST BP <80 MM HG: CPT | Performed by: INTERNAL MEDICINE

## 2023-09-14 PROCEDURE — 1125F AMNT PAIN NOTED PAIN PRSNT: CPT | Performed by: INTERNAL MEDICINE

## 2023-09-14 NOTE — PATIENT INSTRUCTIONS
Your blood pressure was elevated today. Please check at home for a goal of less than 130/80  Please re start both the Miralax and Metamucil to help with your constipation   Decrease your Prednisone to 5 mg daily  Be sure to follow up with Dr. Isaacs in [place].     Chest and rib xrays ordered today.     Update your skin exam with Dr. Guerrero.    You had your flu vaccine today.  I would recommend you get the updated Covid booster and RSV

## 2023-10-04 ENCOUNTER — ANCILLARY PROCEDURE (OUTPATIENT)
Dept: RADIOLOGY | Facility: CLINIC | Age: 68
End: 2023-10-04
Payer: MEDICARE

## 2023-10-04 DIAGNOSIS — Z12.31 ENCOUNTER FOR SCREENING MAMMOGRAM FOR MALIGNANT NEOPLASM OF BREAST: ICD-10-CM

## 2023-10-04 DIAGNOSIS — I10 ESSENTIAL (PRIMARY) HYPERTENSION: Primary | ICD-10-CM

## 2023-10-04 PROCEDURE — 77067 SCR MAMMO BI INCL CAD: CPT

## 2023-10-04 PROCEDURE — 77067 SCR MAMMO BI INCL CAD: CPT | Mod: BILATERAL PROCEDURE | Performed by: RADIOLOGY

## 2023-10-04 PROCEDURE — 77063 BREAST TOMOSYNTHESIS BI: CPT | Mod: BILATERAL PROCEDURE | Performed by: RADIOLOGY

## 2023-10-04 RX ORDER — LISINOPRIL 5 MG/1
5 TABLET ORAL DAILY
Qty: 90 TABLET | Refills: 0 | Status: SHIPPED | OUTPATIENT
Start: 2023-10-04

## 2023-10-04 NOTE — TELEPHONE ENCOUNTER
Update-  9/28- BP 98/55  10/4-/71    Also she is no longer taking Protonix only Omeprazole    No call back

## 2023-10-16 DIAGNOSIS — N39.0 RECURRENT UTI: ICD-10-CM

## 2023-10-16 RX ORDER — ESTRADIOL 0.1 MG/G
1 CREAM VAGINAL 3 TIMES WEEKLY
Qty: 42.5 G | Refills: 3 | Status: SHIPPED | OUTPATIENT
Start: 2023-10-16

## 2023-11-02 ENCOUNTER — PHARMACY VISIT (OUTPATIENT)
Dept: PHARMACY | Facility: CLINIC | Age: 68
End: 2023-11-02
Payer: MEDICARE

## 2023-11-02 ENCOUNTER — SPECIALTY PHARMACY (OUTPATIENT)
Dept: PHARMACY | Facility: CLINIC | Age: 68
End: 2023-11-02

## 2023-11-02 PROCEDURE — RXMED WILLOW AMBULATORY MEDICATION CHARGE

## 2023-11-15 DIAGNOSIS — G43.909 MIGRAINE WITHOUT STATUS MIGRAINOSUS, NOT INTRACTABLE, UNSPECIFIED MIGRAINE TYPE: Primary | ICD-10-CM

## 2023-11-15 RX ORDER — PROPRANOLOL HYDROCHLORIDE 20 MG/1
20 TABLET ORAL NIGHTLY
Qty: 90 TABLET | Refills: 1 | Status: SHIPPED | OUTPATIENT
Start: 2023-11-15

## 2023-11-30 ENCOUNTER — PHARMACY VISIT (OUTPATIENT)
Dept: PHARMACY | Facility: CLINIC | Age: 68
End: 2023-11-30
Payer: MEDICARE

## 2023-11-30 ENCOUNTER — SPECIALTY PHARMACY (OUTPATIENT)
Dept: PHARMACY | Facility: CLINIC | Age: 68
End: 2023-11-30

## 2023-11-30 PROCEDURE — RXMED WILLOW AMBULATORY MEDICATION CHARGE

## 2023-12-04 ENCOUNTER — TELEPHONE (OUTPATIENT)
Dept: SLEEP MEDICINE | Facility: HOSPITAL | Age: 68
End: 2023-12-04

## 2023-12-04 DIAGNOSIS — G47.33 OBSTRUCTIVE SLEEP APNEA OF ADULT: ICD-10-CM

## 2023-12-04 NOTE — TELEPHONE ENCOUNTER
Patient called stating she has had problems getting supplies from [facility]. Patient states she had to pay out of pocket for her machine filters because she was unable to get them from [facility]. Patient is requesting a PAP Supplies Prescription to be sent to Sarah at fax [phone].     Follow-Up Appointment scheduled with Dr. Christopher for 05/24/2023.    Message sent to Dr. Christopher.

## 2023-12-19 ENCOUNTER — TELEPHONE (OUTPATIENT)
Dept: SLEEP MEDICINE | Facility: HOSPITAL | Age: 68
End: 2023-12-19

## 2023-12-19 NOTE — TELEPHONE ENCOUNTER
Patient called and stated Sarah needs a copy of her CPAP DL for last 30 days from her PAP machine and a copy of her sleep study signed.    Faxed copy of sleep study to [facility] at [phone] from [employer].    Message sent to [facility] to request a copy of patient PAP DL.

## 2024-01-02 DIAGNOSIS — D89.813 GRAFT VS HOST DISEASE (MULTI): ICD-10-CM

## 2024-01-02 DIAGNOSIS — Z94.81 BONE MARROW TRANSPLANT STATUS (MULTI): ICD-10-CM

## 2024-01-03 ENCOUNTER — SPECIALTY PHARMACY (OUTPATIENT)
Dept: PHARMACY | Facility: CLINIC | Age: 69
End: 2024-01-03

## 2024-01-04 PROCEDURE — RXMED WILLOW AMBULATORY MEDICATION CHARGE

## 2024-01-09 ENCOUNTER — PHARMACY VISIT (OUTPATIENT)
Dept: PHARMACY | Facility: CLINIC | Age: 69
End: 2024-01-09
Payer: COMMERCIAL

## 2024-01-31 ENCOUNTER — SPECIALTY PHARMACY (OUTPATIENT)
Dept: PHARMACY | Facility: CLINIC | Age: 69
End: 2024-01-31

## 2024-01-31 PROCEDURE — RXMED WILLOW AMBULATORY MEDICATION CHARGE

## 2024-02-01 ENCOUNTER — PHARMACY VISIT (OUTPATIENT)
Dept: PHARMACY | Facility: CLINIC | Age: 69
End: 2024-02-01
Payer: COMMERCIAL

## 2024-02-16 ENCOUNTER — TELEPHONE (OUTPATIENT)
Dept: SLEEP MEDICINE | Facility: HOSPITAL | Age: 69
End: 2024-02-16
Payer: MEDICARE

## 2024-02-16 NOTE — TELEPHONE ENCOUNTER
Patient called and asked if we received her CPAP SD card yet to obtain a DL that she mailed two weeks ago from [place]. Checked Dr. Christopher's mailbox and we still have not received it. Relayed this information to the patient. Patient verbalized understanding.

## 2024-02-28 ENCOUNTER — TELEPHONE (OUTPATIENT)
Dept: SLEEP MEDICINE | Facility: HOSPITAL | Age: 69
End: 2024-02-28
Payer: MEDICARE

## 2024-02-28 NOTE — TELEPHONE ENCOUNTER
Patient called to see if we received the SD card she sent to Dr. Christopher to obtain the DL needed from her CPAP machine. Spoke with Corrie Calderon and we have received it.   Patient asked that once Dr. Christopher is able to obtain the DL for her that we call her and let her know that it is available.

## 2024-03-05 ENCOUNTER — SPECIALTY PHARMACY (OUTPATIENT)
Dept: PHARMACY | Facility: CLINIC | Age: 69
End: 2024-03-05

## 2024-03-05 PROCEDURE — RXMED WILLOW AMBULATORY MEDICATION CHARGE

## 2024-03-06 ENCOUNTER — PHARMACY VISIT (OUTPATIENT)
Dept: PHARMACY | Facility: CLINIC | Age: 69
End: 2024-03-06
Payer: COMMERCIAL

## 2024-03-15 ENCOUNTER — DOCUMENTATION (OUTPATIENT)
Dept: SLEEP MEDICINE | Facility: CLINIC | Age: 69
End: 2024-03-15
Payer: MEDICARE

## 2024-03-26 ENCOUNTER — SPECIALTY PHARMACY (OUTPATIENT)
Dept: PHARMACY | Facility: CLINIC | Age: 69
End: 2024-03-26

## 2024-05-24 ENCOUNTER — APPOINTMENT (OUTPATIENT)
Dept: SLEEP MEDICINE | Facility: CLINIC | Age: 69
End: 2024-05-24
Payer: MEDICARE